# Patient Record
Sex: FEMALE | Race: WHITE
[De-identification: names, ages, dates, MRNs, and addresses within clinical notes are randomized per-mention and may not be internally consistent; named-entity substitution may affect disease eponyms.]

---

## 2017-09-07 ENCOUNTER — HOSPITAL ENCOUNTER (EMERGENCY)
Dept: HOSPITAL 46 - ED | Age: 32
Discharge: HOME | End: 2017-09-07
Payer: COMMERCIAL

## 2017-09-07 VITALS — WEIGHT: 201 LBS | HEIGHT: 65 IN | BODY MASS INDEX: 33.49 KG/M2

## 2017-09-07 DIAGNOSIS — Z91.030: ICD-10-CM

## 2017-09-07 DIAGNOSIS — E03.9: ICD-10-CM

## 2017-09-07 DIAGNOSIS — K52.9: Primary | ICD-10-CM

## 2017-09-07 DIAGNOSIS — Z79.899: ICD-10-CM

## 2017-09-07 DIAGNOSIS — Z87.442: ICD-10-CM

## 2017-09-07 DIAGNOSIS — F17.200: ICD-10-CM

## 2017-09-07 DIAGNOSIS — Z88.0: ICD-10-CM

## 2017-09-07 DIAGNOSIS — Z88.1: ICD-10-CM

## 2018-03-31 ENCOUNTER — HOSPITAL ENCOUNTER (EMERGENCY)
Dept: HOSPITAL 46 - ED | Age: 33
Discharge: HOME | End: 2018-03-31
Payer: COMMERCIAL

## 2018-03-31 VITALS — HEIGHT: 65 IN | WEIGHT: 201 LBS | BODY MASS INDEX: 33.49 KG/M2

## 2018-03-31 DIAGNOSIS — S04.62XA: Primary | ICD-10-CM

## 2018-03-31 DIAGNOSIS — Z87.442: ICD-10-CM

## 2018-03-31 DIAGNOSIS — Z79.899: ICD-10-CM

## 2018-03-31 DIAGNOSIS — Z88.0: ICD-10-CM
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
## 2018-03-31 DIAGNOSIS — F17.200: ICD-10-CM

## 2018-03-31 DIAGNOSIS — Z88.1: ICD-10-CM

## 2018-03-31 DIAGNOSIS — E03.9: ICD-10-CM

## 2018-03-31 DIAGNOSIS — Z91.030: ICD-10-CM

## 2018-03-31 NOTE — XMS
Clinical Summary
  Created on: 2018
 
 Zoila Gamboa
 External Reference #: [id number]
 : 87
 Sex: Female
 
 Demographics
 
 
+-----------------------+------------------------+
| Address               | [address]       |
|                       | [place]  [address]   |
+-----------------------+------------------------+
| Home Phone            | [phone]        |
+-----------------------+------------------------+
| Preferred Language    | Unknown                |
+-----------------------+------------------------+
| Marital Status        | Single                 |
+-----------------------+------------------------+
| Christian Affiliation | Unknown                |
+-----------------------+------------------------+
| Race                  | White                  |
+-----------------------+------------------------+
| Ethnic Group          | Not  or  |
+-----------------------+------------------------+
 
 
 Author
 
 
+--------------+---------------------+
| Author       | [facility] |
+--------------+---------------------+
| Organization | [facility] |
+--------------+---------------------+
| Address      | Unknown             |
+--------------+---------------------+
| Phone        | Unavailable         |
+--------------+---------------------+
 
 
 
 Support
 
 
+-------------+--------------+---------+-----------------+
| Name        | Relationship | Address | Phone           |
+-------------+--------------+---------+-----------------+
| Niraj Gamboa | ECON         | Unknown | [phone] |
+-------------+--------------+---------+-----------------+
 
 
 
 Care Team Providers
 
 
 
+-----------------------+------+-------------+
| Care Team Member Name | Role | Phone       |
+-----------------------+------+-------------+
| Sherrie Byrne NP | PP   | Unavailable |
+-----------------------+------+-------------+
 
 
 
 Source Comments
 [facility] is [facility] and [facility]
 
 Allergies
 
 
+-----------------+---------------------+----------+----------+------------------+
| Active Allergy  | Reactions           | Severity | Noted    | Comments         |
|                 |                     |          | Date     |                  |
+-----------------+---------------------+----------+----------+------------------+
| Acetaminophen   | Nausea and Vomiting |          | 10/27/20 |                  |
|                 |                     |          | 16       |                  |
+-----------------+---------------------+----------+----------+------------------+
| Amoxicillin     | Stomach Upset       |          | 10/27/20 |   Stomach issues |
|                 |                     |          | 16       |                  |
+-----------------+---------------------+----------+----------+------------------+
| Azithromycin    |                     |          | 20 |                  |
|                 |                     |          | 09       |                  |
+-----------------+---------------------+----------+----------+------------------+
| Ibuprofen       |                     |          | 20 |                  |
|                 |                     |          | 09       |                  |
+-----------------+---------------------+----------+----------+------------------+
| Morphine        | Nausea and Vomiting |          | 20 |                  |
|                 |                     |          | 17       |                  |
+-----------------+---------------------+----------+----------+------------------+
| Penicillins     | Stomach Upset       |          | 10/27/20 |   Stomach issues |
|                 |                     |          | 16       |                  |
+-----------------+---------------------+----------+----------+------------------+
| Venom-Honey Bee | Anaphylaxis         | High     | 10/27/20 |                  |
|                 |                     |          | 16       |                  |
+-----------------+---------------------+----------+----------+------------------+
 
 
 
 Current Medications
 
 
+----------------------+----------------------+--------+---------+------+------+-------+
| Prescription         | Sig.                 | Disp.  | Refills | Star | End  | Statu |
|                      |                      |        |         | t    | Date | s     |
|                      |                      |        |         | Date |      |       |
+----------------------+----------------------+--------+---------+------+------+-------+
|   levothyroxine 100  | Take 100 mcg by      |        | 0       | 09/2 |      | Activ |
| mcg oral tablet      | mouth once daily.    |        |         | 2/20 |      | e     |
|                      |                      |        |         | 16   |      |       |
+----------------------+----------------------+--------+---------+------+------+-------+
|   potassium chloride |                      |        | 1       | 08/1 |      | Activ |
|  SR 20 mEq oral      |                      |        |         | 1/20 |      | e     |
| tablet,ER            |                      |        |         | 16   |      |       |
| particles/crystals   |                      |        |         |      |      |       |
+----------------------+----------------------+--------+---------+------+------+-------+
 
|   meloxicam 15 mg    |                      |        | 1       | 09/1 |      | Activ |
| oral tablet          |                      |        |         | 1/20 |      | e     |
|                      |                      |        |         | 16   |      |       |
+----------------------+----------------------+--------+---------+------+------+-------+
|   nadolol 80 mg oral |                      |        | 0       | 09/2 |      | Activ |
|  tablet              |                      |        |         | 20 |      | e     |
|                      |                      |        |         | 16   |      |       |
+----------------------+----------------------+--------+---------+------+------+-------+
|   gabapentin 300 mg  |                      |        | 0       | 09/2 |      | Activ |
| oral capsule         |                      |        |         | /20 |      | e     |
|                      |                      |        |         | 16   |      |       |
+----------------------+----------------------+--------+---------+------+------+-------+
|   DULoxetine 30 mg   |                      |        | 0       | 09/2 |      | Activ |
| oral capsule,delayed |                      |        |         | /20 |      | e     |
|  release(DR/EC)      |                      |        |         | 16   |      |       |
+----------------------+----------------------+--------+---------+------+------+-------+
|   JULEBER 0.15-0.03  | Take 1 tablet by     |        | 0       | 09/2 |      | Activ |
| mg oral tablet       | mouth once daily.    |        |         |  |      | e     |
|                      |                      |        |         | 16   |      |       |
+----------------------+----------------------+--------+---------+------+------+-------+
|   omeprazole 40 mg   |                      |        |         |      |      | Activ |
| oral capsule,delayed |                      |        |         |      |      | e     |
|  release(DR/EC)      |                      |        |         |      |      |       |
+----------------------+----------------------+--------+---------+------+------+-------+
|   GLUCOSAMINE        | Take  by mouth.      |        |         |      |      | Activ |
| SULFATE (GLUCOSAMINE |                      |        |         |      |      | e     |
|  ORAL)               |                      |        |         |      |      |       |
+----------------------+----------------------+--------+---------+------+------+-------+
|   nitrofurantoin     | Take 100 mg by mouth |        | 0       | 02/2 |      | Activ |
| monohydrate/macrocry |  two times daily.    |        |         | /20 |      | e     |
| stal 100 mg oral     |                      |        |         | 17   |      |       |
| capsule              |                      |        |         |      |      |       |
+----------------------+----------------------+--------+---------+------+------+-------+
|   phenazopyridine    | Take 1 tablet by     |   30   | 1       | 02/2 |      | Activ |
| 100 mg oral tablet   | mouth three times    | tablet |         | 4/20 |      | e     |
|                      | daily as needed.     |        |         | 17   |      |       |
|                      | Administer after     |        |         |      |      |       |
|                      | meals for 2 days.    |        |         |      |      |       |
+----------------------+----------------------+--------+---------+------+------+-------+
|   promethazine 12.5  | Take 1 tablet by     |   30   | 1       | 02/2 |      | Activ |
| mg oral tablet       | mouth four times     | tablet |         | 4/20 |      | e     |
|                      | daily as needed for  |        |         | 17   |      |       |
|                      | nausea/vomiting.     |        |         |      |      |       |
+----------------------+----------------------+--------+---------+------+------+-------+
|                      | Take 1 tablet by     |   10   | 0       | 02/2 |      | Activ |
| HYDROcodone-acetamin | mouth every four     | tablet |         | 4/20 |      | e     |
| ophen 5-325 mg oral  | hours as needed.     |        |         | 17   |      |       |
| tablet               |                      |        |         |      |      |       |
+----------------------+----------------------+--------+---------+------+------+-------+
 
 
 
 Active Problems
 
 
+------------------------+------------+
| Problem                | Noted Date |
+------------------------+------------+
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
+------------------------+------------+
 
| Night vision loss      | 2009 |
+------------------------+------------+
| Vision loss            | 2009 |
+------------------------+------------+
 
 
 
 Social History
 
 
+--------------------+-------+-----------+--------+------+
| Tobacco Use        | Types | Packs/Day | Years  | Date |
|                    |       |           | Used   |      |
+--------------------+-------+-----------+--------+------+
| Current Every Day  |       |           |        |      |
| Smoker             |       |           |        |      |
+--------------------+-------+-----------+--------+------+
 
 
 
+--------------------------+
| Comments: trying to quit |
+--------------------------+
 
 
 
+------------------+---------------+
| Sex Assigned at  | Date Recorded |
| Birth            |               |
+------------------+---------------+
| Not on file      |               |
+------------------+---------------+
 
 
 
 Last Filed Vital Signs
 
 
+-------------------+----------------------+-------------------------+
| Vital Sign        | Reading              | Time Taken              |
+-------------------+----------------------+-------------------------+
| Blood Pressure    | 100/80               | 2017  2:04 PM PST |
+-------------------+----------------------+-------------------------+
| Pulse             | 79                   | 2017  2:04 PM PST |
+-------------------+----------------------+-------------------------+
| Temperature       | 37.2   C (99   F)    | 2017  2:04 PM PST |
+-------------------+----------------------+-------------------------+
| Respiratory Rate  | -                    | -                       |
+-------------------+----------------------+-------------------------+
| Oxygen Saturation | 96%                  | 2017  2:04 PM PST |
+-------------------+----------------------+-------------------------+
| Inhaled Oxygen    | -                    | -                       |
| Concentration     |                      |                         |
+-------------------+----------------------+-------------------------+
| Weight            | 100.3 kg (221 lb 1.6 | 2017  2:04 PM PST |
|                   |  oz)                 |                         |
+-------------------+----------------------+-------------------------+
| Height            | 165.1 cm (5' 5")     | 2017  2:04 PM PST |
+-------------------+----------------------+-------------------------+
| Body Mass Index   | 36.79                | 2017  2:04 PM PST |
 
+-------------------+----------------------+-------------------------+
 
 
 
 Plan of Treatment
 
 
+----------------------+-----------+------------+----------+
| Health Maintenance   | Due Date  | Last Done  | Comments |
+----------------------+-----------+------------+----------+
| PRECONCEPTION/CONTRA |  |            |          |
| CEPTION COUNSELING   | 6         |            |          |
+----------------------+-----------+------------+----------+
| INFLUENZA VACCINE    |  | 2016 |          |
| (FLU SHOT)           | 7         |            |          |
+----------------------+-----------+------------+----------+
 
 
 
 Results
 Not on filefrom Last 3 Months

## 2018-03-31 NOTE — XMS
Clinical Summary
  Created on: 2018
 
 Zoila Gamboa
 External Reference #: [id number]
 : 87
 Sex: Female
 
 Demographics
 
 
+-----------------------+------------------------+
| Address               | [address]       |
|                       | [place]  [address]   |
+-----------------------+------------------------+
| Home Phone            | [phone]        |
+-----------------------+------------------------+
| Preferred Language    | Unknown                |
+-----------------------+------------------------+
| Marital Status        | Single                 |
+-----------------------+------------------------+
| Shinto Affiliation | Unknown                |
+-----------------------+------------------------+
| Race                  | White                  |
+-----------------------+------------------------+
| Ethnic Group          | Not  or  |
+-----------------------+------------------------+
 
 
 Author
 
 
+--------------+---------------------+
| Author       | [facility] |
+--------------+---------------------+
| Organization | [facility] |
+--------------+---------------------+
| Address      | Unknown             |
+--------------+---------------------+
| Phone        | Unavailable         |
+--------------+---------------------+
 
 
 
 Support
 
 
+-------------+--------------+---------+-----------------+
| Name        | Relationship | Address | Phone           |
+-------------+--------------+---------+-----------------+
| Niraj Gamboa | ECON         | Unknown | [phone] |
+-------------+--------------+---------+-----------------+
 
 
 
 Care Team Providers
 
 
 
+-----------------------+------+-------------+
| Care Team Member Name | Role | Phone       |
+-----------------------+------+-------------+
| Sherrie Byrne NP | PP   | Unavailable |
+-----------------------+------+-------------+
 
 
 
 Source Comments
 [facility]CHU[facility] is fully live on both [facility]
 
 Allergies
 
 
+-----------------+---------------------+----------+----------+------------------+
| Active Allergy  | Reactions           | Severity | Noted    | Comments         |
|                 |                     |          | Date     |                  |
+-----------------+---------------------+----------+----------+------------------+
| Acetaminophen   | Nausea and Vomiting |          | 10/27/20 |                  |
|                 |                     |          | 16       |                  |
+-----------------+---------------------+----------+----------+------------------+
| Amoxicillin     | Stomach Upset       |          | 10/27/20 |   Stomach issues |
|                 |                     |          | 16       |                  |
+-----------------+---------------------+----------+----------+------------------+
| Azithromycin    |                     |          | 20 |                  |
|                 |                     |          | 09       |                  |
+-----------------+---------------------+----------+----------+------------------+
| Ibuprofen       |                     |          | 20 |                  |
|                 |                     |          | 09       |                  |
+-----------------+---------------------+----------+----------+------------------+
| Morphine        | Nausea and Vomiting |          | 20 |                  |
|                 |                     |          | 17       |                  |
+-----------------+---------------------+----------+----------+------------------+
| Penicillins     | Stomach Upset       |          | 10/27/20 |   Stomach issues |
|                 |                     |          | 16       |                  |
+-----------------+---------------------+----------+----------+------------------+
| Venom-Honey Bee | Anaphylaxis         | High     | 10/27/20 |                  |
|                 |                     |          | 16       |                  |
+-----------------+---------------------+----------+----------+------------------+
 
 
 
 Current Medications
 
 
+----------------------+----------------------+--------+---------+------+------+-------+
| Prescription         | Sig.                 | Disp.  | Refills | Star | End  | Statu |
|                      |                      |        |         | t    | Date | s     |
|                      |                      |        |         | Date |      |       |
+----------------------+----------------------+--------+---------+------+------+-------+
|   levothyroxine 100  | Take 100 mcg by      |        | 0       | 09/2 |      | Activ |
| mcg oral tablet      | mouth once daily.    |        |         | 2/20 |      | e     |
|                      |                      |        |         | 16   |      |       |
+----------------------+----------------------+--------+---------+------+------+-------+
|   potassium chloride |                      |        | 1       | 08/1 |      | Activ |
|  SR 20 mEq oral      |                      |        |         | 1/20 |      | e     |
| tablet,ER            |                      |        |         | 16   |      |       |
| particles/crystals   |                      |        |         |      |      |       |
+----------------------+----------------------+--------+---------+------+------+-------+
 
|   meloxicam 15 mg    |                      |        | 1       | 09/1 |      | Activ |
| oral tablet          |                      |        |         | 1/20 |      | e     |
|                      |                      |        |         | 16   |      |       |
+----------------------+----------------------+--------+---------+------+------+-------+
|   nadolol 80 mg oral |                      |        | 0       | 09/2 |      | Activ |
|  tablet              |                      |        |         | 20 |      | e     |
|                      |                      |        |         | 16   |      |       |
+----------------------+----------------------+--------+---------+------+------+-------+
|   gabapentin 300 mg  |                      |        | 0       | 09/2 |      | Activ |
| oral capsule         |                      |        |         | /20 |      | e     |
|                      |                      |        |         | 16   |      |       |
+----------------------+----------------------+--------+---------+------+------+-------+
|   DULoxetine 30 mg   |                      |        | 0       | 09/2 |      | Activ |
| oral capsule,delayed |                      |        |         | /20 |      | e     |
|  release(DR/EC)      |                      |        |         | 16   |      |       |
+----------------------+----------------------+--------+---------+------+------+-------+
|   JULEBER 0.15-0.03  | Take 1 tablet by     |        | 0       | 09/2 |      | Activ |
| mg oral tablet       | mouth once daily.    |        |         |  |      | e     |
|                      |                      |        |         | 16   |      |       |
+----------------------+----------------------+--------+---------+------+------+-------+
|   omeprazole 40 mg   |                      |        |         |      |      | Activ |
| oral capsule,delayed |                      |        |         |      |      | e     |
|  release(DR/EC)      |                      |        |         |      |      |       |
+----------------------+----------------------+--------+---------+------+------+-------+
|   GLUCOSAMINE        | Take  by mouth.      |        |         |      |      | Activ |
| SULFATE (GLUCOSAMINE |                      |        |         |      |      | e     |
|  ORAL)               |                      |        |         |      |      |       |
+----------------------+----------------------+--------+---------+------+------+-------+
|   nitrofurantoin     | Take 100 mg by mouth |        | 0       | 02/2 |      | Activ |
| monohydrate/macrocry |  two times daily.    |        |         | /20 |      | e     |
| stal 100 mg oral     |                      |        |         | 17   |      |       |
| capsule              |                      |        |         |      |      |       |
+----------------------+----------------------+--------+---------+------+------+-------+
|   phenazopyridine    | Take 1 tablet by     |   30   | 1       | 02/2 |      | Activ |
| 100 mg oral tablet   | mouth three times    | tablet |         | 4/20 |      | e     |
|                      | daily as needed.     |        |         | 17   |      |       |
|                      | Administer after     |        |         |      |      |       |
|                      | meals for 2 days.    |        |         |      |      |       |
+----------------------+----------------------+--------+---------+------+------+-------+
|   promethazine 12.5  | Take 1 tablet by     |   30   | 1       | 02/2 |      | Activ |
| mg oral tablet       | mouth four times     | tablet |         | 4/20 |      | e     |
|                      | daily as needed for  |        |         | 17   |      |       |
|                      | nausea/vomiting.     |        |         |      |      |       |
+----------------------+----------------------+--------+---------+------+------+-------+
|                      | Take 1 tablet by     |   10   | 0       | 02/2 |      | Activ |
| HYDROcodone-acetamin | mouth every four     | tablet |         | 4/20 |      | e     |
| ophen 5-325 mg oral  | hours as needed.     |        |         | 17   |      |       |
| tablet               |                      |        |         |      |      |       |
+----------------------+----------------------+--------+---------+------+------+-------+
 
 
 
 Active Problems
 
 
+------------------------+------------+
| Problem                | Noted Date |
+------------------------+------------+
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
+------------------------+------------+
 
| Night vision loss      | 2009 |
+------------------------+------------+
| Vision loss            | 2009 |
+------------------------+------------+
 
 
 
 Social History
 
 
+--------------------+-------+-----------+--------+------+
| Tobacco Use        | Types | Packs/Day | Years  | Date |
|                    |       |           | Used   |      |
+--------------------+-------+-----------+--------+------+
| Current Every Day  |       |           |        |      |
| Smoker             |       |           |        |      |
+--------------------+-------+-----------+--------+------+
 
 
 
+--------------------------+
| Comments: trying to quit |
+--------------------------+
 
 
 
+------------------+---------------+
| Sex Assigned at  | Date Recorded |
| Birth            |               |
+------------------+---------------+
| Not on file      |               |
+------------------+---------------+
 
 
 
 Last Filed Vital Signs
 
 
+-------------------+----------------------+-------------------------+
| Vital Sign        | Reading              | Time Taken              |
+-------------------+----------------------+-------------------------+
| Blood Pressure    | 100/80               | 2017  2:04 PM PST |
+-------------------+----------------------+-------------------------+
| Pulse             | 79                   | 2017  2:04 PM PST |
+-------------------+----------------------+-------------------------+
| Temperature       | 37.2   C (99   F)    | 2017  2:04 PM PST |
+-------------------+----------------------+-------------------------+
| Respiratory Rate  | -                    | -                       |
+-------------------+----------------------+-------------------------+
| Oxygen Saturation | 96%                  | 2017  2:04 PM PST |
+-------------------+----------------------+-------------------------+
| Inhaled Oxygen    | -                    | -                       |
| Concentration     |                      |                         |
+-------------------+----------------------+-------------------------+
| Weight            | 100.3 kg (221 lb 1.6 | 2017  2:04 PM PST |
|                   |  oz)                 |                         |
+-------------------+----------------------+-------------------------+
| Height            | 165.1 cm (5' 5")     | 2017  2:04 PM PST |
+-------------------+----------------------+-------------------------+
| Body Mass Index   | 36.79                | 2017  2:04 PM PST |
 
+-------------------+----------------------+-------------------------+
 
 
 
 Plan of Treatment
 
 
+----------------------+-----------+------------+----------+
| Health Maintenance   | Due Date  | Last Done  | Comments |
+----------------------+-----------+------------+----------+
| PRECONCEPTION/CONTRA |  |            |          |
| CEPTION COUNSELING   | 6         |            |          |
+----------------------+-----------+------------+----------+
| INFLUENZA VACCINE    |  | 2016 |          |
| (FLU SHOT)           | 7         |            |          |
+----------------------+-----------+------------+----------+
 
 
 
 Results
 Not on filefrom Last 3 Months

## 2018-04-04 ENCOUNTER — HOSPITAL ENCOUNTER (EMERGENCY)
Dept: HOSPITAL 46 - ED | Age: 33
Discharge: HOME | End: 2018-04-04
Payer: COMMERCIAL

## 2018-04-04 VITALS — HEIGHT: 65 IN | BODY MASS INDEX: 35.65 KG/M2 | WEIGHT: 214 LBS

## 2018-04-04 DIAGNOSIS — Z91.030: ICD-10-CM

## 2018-04-04 DIAGNOSIS — E03.9: ICD-10-CM

## 2018-04-04 DIAGNOSIS — Z88.5: ICD-10-CM
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
## 2018-04-04 DIAGNOSIS — Z79.899: ICD-10-CM

## 2018-04-04 DIAGNOSIS — F17.200: ICD-10-CM

## 2018-04-04 DIAGNOSIS — S82.434A: Primary | ICD-10-CM

## 2018-04-04 DIAGNOSIS — Z88.1: ICD-10-CM

## 2018-04-04 DIAGNOSIS — Z88.0: ICD-10-CM

## 2018-04-04 PROCEDURE — 2W3LX1Z IMMOBILIZATION OF RIGHT LOWER EXTREMITY USING SPLINT: ICD-10-PCS

## 2018-04-04 NOTE — XMS
Clinical Summary
  Created on: 2018
 
 Esteban Gamboane Thong
 External Reference #: [id number]
 : 87
 Sex: Female
 
 Demographics
 
 
+-----------------------+------------------------+
| Address               | [address]       |
|                       | [place]  [address]   |
+-----------------------+------------------------+
| Home Phone            | [phone]        |
+-----------------------+------------------------+
| Preferred Language    | Unknown                |
+-----------------------+------------------------+
| Marital Status        | Single                 |
+-----------------------+------------------------+
| Sabianist Affiliation | Unknown                |
+-----------------------+------------------------+
| Race                  | White                  |
+-----------------------+------------------------+
| Ethnic Group          | Not  or  |
+-----------------------+------------------------+
 
 
 Author
 
 
+--------------+---------------------+
| Author       | [facility] |
+--------------+---------------------+
| Organization | [facility] |
+--------------+---------------------+
| Address      | Unknown             |
+--------------+---------------------+
| Phone        | Unavailable         |
+--------------+---------------------+
 
 
 
 Support
 
 
+-------------+--------------+---------+-----------------+
| Name        | Relationship | Address | Phone           |
+-------------+--------------+---------+-----------------+
| Niraj Gamboa | ECON         | Unknown | [phone] |
+-------------+--------------+---------+-----------------+
 
 
 
 Care Team Providers
 
 
 
+-----------------------+------+-------------+
| Care Team Member Name | Role | Phone       |
+-----------------------+------+-------------+
| Sherrie Byrne NP | PP   | Unavailable |
+-----------------------+------+-------------+
 
 
 
 Source Comments
 [facility] is [facility] & [facility]
 
 Allergies
 
 
+-----------------+---------------------+----------+----------+------------------+
| Active Allergy  | Reactions           | Severity | Noted    | Comments         |
|                 |                     |          | Date     |                  |
+-----------------+---------------------+----------+----------+------------------+
| Acetaminophen   | Nausea and Vomiting |          | 10/27/20 |                  |
|                 |                     |          | 16       |                  |
+-----------------+---------------------+----------+----------+------------------+
| Amoxicillin     | Stomach Upset       |          | 10/27/20 |   Stomach issues |
|                 |                     |          | 16       |                  |
+-----------------+---------------------+----------+----------+------------------+
| Azithromycin    |                     |          | 20 |                  |
|                 |                     |          | 09       |                  |
+-----------------+---------------------+----------+----------+------------------+
| Ibuprofen       |                     |          | 20 |                  |
|                 |                     |          | 09       |                  |
+-----------------+---------------------+----------+----------+------------------+
| Morphine        | Nausea and Vomiting |          | 20 |                  |
|                 |                     |          | 17       |                  |
+-----------------+---------------------+----------+----------+------------------+
| Penicillins     | Stomach Upset       |          | 10/27/20 |   Stomach issues |
|                 |                     |          | 16       |                  |
+-----------------+---------------------+----------+----------+------------------+
| Venom-Honey Bee | Anaphylaxis         | High     | 10/27/20 |                  |
|                 |                     |          | 16       |                  |
+-----------------+---------------------+----------+----------+------------------+
 
 
 
 Current Medications
 
 
+----------------------+----------------------+--------+---------+------+------+-------+
| Prescription         | Sig.                 | Disp.  | Refills | Star | End  | Statu |
|                      |                      |        |         | t    | Date | s     |
|                      |                      |        |         | Date |      |       |
+----------------------+----------------------+--------+---------+------+------+-------+
|   levothyroxine 100  | Take 100 mcg by      |        | 0       | 09/2 |      | Activ |
| mcg oral tablet      | mouth once daily.    |        |         | 2/20 |      | e     |
|                      |                      |        |         | 16   |      |       |
+----------------------+----------------------+--------+---------+------+------+-------+
|   potassium chloride |                      |        | 1       | 08/1 |      | Activ |
|  SR 20 mEq oral      |                      |        |         | 1/20 |      | e     |
| tablet,ER            |                      |        |         | 16   |      |       |
| particles/crystals   |                      |        |         |      |      |       |
+----------------------+----------------------+--------+---------+------+------+-------+
 
|   meloxicam 15 mg    |                      |        | 1       | 09/1 |      | Activ |
| oral tablet          |                      |        |         | 1/20 |      | e     |
|                      |                      |        |         | 16   |      |       |
+----------------------+----------------------+--------+---------+------+------+-------+
|   nadolol 80 mg oral |                      |        | 0       | 09/2 |      | Activ |
|  tablet              |                      |        |         | 20 |      | e     |
|                      |                      |        |         | 16   |      |       |
+----------------------+----------------------+--------+---------+------+------+-------+
|   gabapentin 300 mg  |                      |        | 0       | 09/2 |      | Activ |
| oral capsule         |                      |        |         | /20 |      | e     |
|                      |                      |        |         | 16   |      |       |
+----------------------+----------------------+--------+---------+------+------+-------+
|   DULoxetine 30 mg   |                      |        | 0       | 09/2 |      | Activ |
| oral capsule,delayed |                      |        |         | /20 |      | e     |
|  release(DR/EC)      |                      |        |         | 16   |      |       |
+----------------------+----------------------+--------+---------+------+------+-------+
|   JULEBER 0.15-0.03  | Take 1 tablet by     |        | 0       | 09/2 |      | Activ |
| mg oral tablet       | mouth once daily.    |        |         |  |      | e     |
|                      |                      |        |         | 16   |      |       |
+----------------------+----------------------+--------+---------+------+------+-------+
|   omeprazole 40 mg   |                      |        |         |      |      | Activ |
| oral capsule,delayed |                      |        |         |      |      | e     |
|  release(DR/EC)      |                      |        |         |      |      |       |
+----------------------+----------------------+--------+---------+------+------+-------+
|   GLUCOSAMINE        | Take  by mouth.      |        |         |      |      | Activ |
| SULFATE (GLUCOSAMINE |                      |        |         |      |      | e     |
|  ORAL)               |                      |        |         |      |      |       |
+----------------------+----------------------+--------+---------+------+------+-------+
|   nitrofurantoin     | Take 100 mg by mouth |        | 0       | 02/2 |      | Activ |
| monohydrate/macrocry |  two times daily.    |        |         | /20 |      | e     |
| stal 100 mg oral     |                      |        |         | 17   |      |       |
| capsule              |                      |        |         |      |      |       |
+----------------------+----------------------+--------+---------+------+------+-------+
|   phenazopyridine    | Take 1 tablet by     |   30   | 1       | 02/2 |      | Activ |
| 100 mg oral tablet   | mouth three times    | tablet |         | 4/20 |      | e     |
|                      | daily as needed.     |        |         | 17   |      |       |
|                      | Administer after     |        |         |      |      |       |
|                      | meals for 2 days.    |        |         |      |      |       |
+----------------------+----------------------+--------+---------+------+------+-------+
|   promethazine 12.5  | Take 1 tablet by     |   30   | 1       | 02/2 |      | Activ |
| mg oral tablet       | mouth four times     | tablet |         | 4/20 |      | e     |
|                      | daily as needed for  |        |         | 17   |      |       |
|                      | nausea/vomiting.     |        |         |      |      |       |
+----------------------+----------------------+--------+---------+------+------+-------+
|                      | Take 1 tablet by     |   10   | 0       | 02/2 |      | Activ |
| HYDROcodone-acetamin | mouth every four     | tablet |         | 4/20 |      | e     |
| ophen 5-325 mg oral  | hours as needed.     |        |         | 17   |      |       |
| tablet               |                      |        |         |      |      |       |
+----------------------+----------------------+--------+---------+------+------+-------+
 
 
 
 Active Problems
 
 
+------------------------+------------+
| Problem                | Noted Date |
+------------------------+------------+
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
+------------------------+------------+
 
| Night vision loss      | 2009 |
+------------------------+------------+
| Vision loss            | 2009 |
+------------------------+------------+
 
 
 
 Social History
 
 
+--------------------+-------+-----------+--------+------+
| Tobacco Use        | Types | Packs/Day | Years  | Date |
|                    |       |           | Used   |      |
+--------------------+-------+-----------+--------+------+
| Current Every Day  |       |           |        |      |
| Smoker             |       |           |        |      |
+--------------------+-------+-----------+--------+------+
 
 
 
+--------------------------+
| Comments: trying to quit |
+--------------------------+
 
 
 
+------------------+---------------+
| Sex Assigned at  | Date Recorded |
| Birth            |               |
+------------------+---------------+
| Not on file      |               |
+------------------+---------------+
 
 
 
 Last Filed Vital Signs
 
 
+-------------------+----------------------+-------------------------+
| Vital Sign        | Reading              | Time Taken              |
+-------------------+----------------------+-------------------------+
| Blood Pressure    | 100/80               | 2017  2:04 PM PST |
+-------------------+----------------------+-------------------------+
| Pulse             | 79                   | 2017  2:04 PM PST |
+-------------------+----------------------+-------------------------+
| Temperature       | 37.2   C (99   F)    | 2017  2:04 PM PST |
+-------------------+----------------------+-------------------------+
| Respiratory Rate  | -                    | -                       |
+-------------------+----------------------+-------------------------+
| Oxygen Saturation | 96%                  | 2017  2:04 PM PST |
+-------------------+----------------------+-------------------------+
| Inhaled Oxygen    | -                    | -                       |
| Concentration     |                      |                         |
+-------------------+----------------------+-------------------------+
| Weight            | 100.3 kg (221 lb 1.6 | 2017  2:04 PM PST |
|                   |  oz)                 |                         |
+-------------------+----------------------+-------------------------+
| Height            | 165.1 cm (5' 5")     | 2017  2:04 PM PST |
+-------------------+----------------------+-------------------------+
| Body Mass Index   | 36.79                | 2017  2:04 PM PST |
 
+-------------------+----------------------+-------------------------+
 
 
 
 Plan of Treatment
 
 
+----------------------+-----------+------------+----------+
| Health Maintenance   | Due Date  | Last Done  | Comments |
+----------------------+-----------+------------+----------+
| PRECONCEPTION/CONTRA |  |            |          |
| CEPTION COUNSELING   | 6         |            |          |
+----------------------+-----------+------------+----------+
| INFLUENZA VACCINE    |  | 2016 |          |
| (FLU SHOT)           | 8         |            |          |
+----------------------+-----------+------------+----------+
 
 
 
 Results
 Not on filefrom Last 3 Months

## 2018-04-04 NOTE — XMS
Clinical Summary
  Created on: 2018
 
 Esteban Gamboane Thong
 External Reference #: [id number]
 : 87
 Sex: Female
 
 Demographics
 
 
+-----------------------+------------------------+
| Address               | [address]       |
|                       | [place]  [address]   |
+-----------------------+------------------------+
| Home Phone            | [phone]        |
+-----------------------+------------------------+
| Preferred Language    | Unknown                |
+-----------------------+------------------------+
| Marital Status        | Single                 |
+-----------------------+------------------------+
| [employer] Affiliation | Unknown                |
+-----------------------+------------------------+
| Race                  | White                  |
+-----------------------+------------------------+
| Ethnic Group          | Not  or  |
+-----------------------+------------------------+
 
 
 Author
 
 
+--------------+---------------------+
| Author       | [facility] |
+--------------+---------------------+
| Organization | [facility] |
+--------------+---------------------+
| Address      | Unknown             |
+--------------+---------------------+
| Phone        | Unavailable         |
+--------------+---------------------+
 
 
 
 Support
 
 
+-------------+--------------+---------+-----------------+
| Name        | Relationship | Address | Phone           |
+-------------+--------------+---------+-----------------+
| Niraj Gamboa | ECON         | Unknown | [phone] |
+-------------+--------------+---------+-----------------+
 
 
 
 Care Team Providers
 
 
 
+-----------------------+------+-------------+
| Care Team Member Name | Role | Phone       |
+-----------------------+------+-------------+
| Sherrie Byrne NP | PP   | Unavailable |
+-----------------------+------+-------------+
 
 
 
 Source Comments
 [facility] is fully live on both [facility] and [facility].Formerly [facility], [facility] & formerly [facility]
 
 Allergies
 
 
+-----------------+---------------------+----------+----------+------------------+
| Active Allergy  | Reactions           | Severity | Noted    | Comments         |
|                 |                     |          | Date     |                  |
+-----------------+---------------------+----------+----------+------------------+
| Acetaminophen   | Nausea and Vomiting |          | 10/27/20 |                  |
|                 |                     |          | 16       |                  |
+-----------------+---------------------+----------+----------+------------------+
| Amoxicillin     | Stomach Upset       |          | 10/27/20 |   Stomach issues |
|                 |                     |          | 16       |                  |
+-----------------+---------------------+----------+----------+------------------+
| Azithromycin    |                     |          | 20 |                  |
|                 |                     |          | 09       |                  |
+-----------------+---------------------+----------+----------+------------------+
| Ibuprofen       |                     |          | 20 |                  |
|                 |                     |          | 09       |                  |
+-----------------+---------------------+----------+----------+------------------+
| Morphine        | Nausea and Vomiting |          | 20 |                  |
|                 |                     |          | 17       |                  |
+-----------------+---------------------+----------+----------+------------------+
| Penicillins     | Stomach Upset       |          | 10/27/20 |   Stomach issues |
|                 |                     |          | 16       |                  |
+-----------------+---------------------+----------+----------+------------------+
| Venom-Honey Bee | Anaphylaxis         | High     | 10/27/20 |                  |
|                 |                     |          | 16       |                  |
+-----------------+---------------------+----------+----------+------------------+
 
 
 
 Current Medications
 
 
+----------------------+----------------------+--------+---------+------+------+-------+
| Prescription         | Sig.                 | Disp.  | Refills | Star | End  | Statu |
|                      |                      |        |         | t    | Date | s     |
|                      |                      |        |         | Date |      |       |
+----------------------+----------------------+--------+---------+------+------+-------+
|   levothyroxine 100  | Take 100 mcg by      |        | 0       | 09/2 |      | Activ |
| mcg oral tablet      | mouth once daily.    |        |         | 2/20 |      | e     |
|                      |                      |        |         | 16   |      |       |
+----------------------+----------------------+--------+---------+------+------+-------+
|   potassium chloride |                      |        | 1       | 08/1 |      | Activ |
|  SR 20 mEq oral      |                      |        |         | 1/20 |      | e     |
| tablet,ER            |                      |        |         | 16   |      |       |
| particles/crystals   |                      |        |         |      |      |       |
+----------------------+----------------------+--------+---------+------+------+-------+
 
|   meloxicam 15 mg    |                      |        | 1       | 09/1 |      | Activ |
| oral tablet          |                      |        |         | 1/20 |      | e     |
|                      |                      |        |         | 16   |      |       |
+----------------------+----------------------+--------+---------+------+------+-------+
|   nadolol 80 mg oral |                      |        | 0       | 09/2 |      | Activ |
|  tablet              |                      |        |         | 20 |      | e     |
|                      |                      |        |         | 16   |      |       |
+----------------------+----------------------+--------+---------+------+------+-------+
|   gabapentin 300 mg  |                      |        | 0       | 09/2 |      | Activ |
| oral capsule         |                      |        |         | /20 |      | e     |
|                      |                      |        |         | 16   |      |       |
+----------------------+----------------------+--------+---------+------+------+-------+
|   DULoxetine 30 mg   |                      |        | 0       | 09/2 |      | Activ |
| oral capsule,delayed |                      |        |         | /20 |      | e     |
|  release(DR/EC)      |                      |        |         | 16   |      |       |
+----------------------+----------------------+--------+---------+------+------+-------+
|   JULEBER 0.15-0.03  | Take 1 tablet by     |        | 0       | 09/2 |      | Activ |
| mg oral tablet       | mouth once daily.    |        |         |  |      | e     |
|                      |                      |        |         | 16   |      |       |
+----------------------+----------------------+--------+---------+------+------+-------+
|   omeprazole 40 mg   |                      |        |         |      |      | Activ |
| oral capsule,delayed |                      |        |         |      |      | e     |
|  release(DR/EC)      |                      |        |         |      |      |       |
+----------------------+----------------------+--------+---------+------+------+-------+
|   GLUCOSAMINE        | Take  by mouth.      |        |         |      |      | Activ |
| SULFATE (GLUCOSAMINE |                      |        |         |      |      | e     |
|  ORAL)               |                      |        |         |      |      |       |
+----------------------+----------------------+--------+---------+------+------+-------+
|   nitrofurantoin     | Take 100 mg by mouth |        | 0       | 02/2 |      | Activ |
| monohydrate/macrocry |  two times daily.    |        |         | /20 |      | e     |
| stal 100 mg oral     |                      |        |         | 17   |      |       |
| capsule              |                      |        |         |      |      |       |
+----------------------+----------------------+--------+---------+------+------+-------+
|   phenazopyridine    | Take 1 tablet by     |   30   | 1       | 02/2 |      | Activ |
| 100 mg oral tablet   | mouth three times    | tablet |         | 4/20 |      | e     |
|                      | daily as needed.     |        |         | 17   |      |       |
|                      | Administer after     |        |         |      |      |       |
|                      | meals for 2 days.    |        |         |      |      |       |
+----------------------+----------------------+--------+---------+------+------+-------+
|   promethazine 12.5  | Take 1 tablet by     |   30   | 1       | 02/2 |      | Activ |
| mg oral tablet       | mouth four times     | tablet |         | 4/20 |      | e     |
|                      | daily as needed for  |        |         | 17   |      |       |
|                      | nausea/vomiting.     |        |         |      |      |       |
+----------------------+----------------------+--------+---------+------+------+-------+
|                      | Take 1 tablet by     |   10   | 0       | 02/2 |      | Activ |
| HYDROcodone-acetamin | mouth every four     | tablet |         | 4/20 |      | e     |
| ophen 5-325 mg oral  | hours as needed.     |        |         | 17   |      |       |
| tablet               |                      |        |         |      |      |       |
+----------------------+----------------------+--------+---------+------+------+-------+
 
 
 
 Active Problems
 
 
+------------------------+------------+
| Problem                | Noted Date |
+------------------------+------------+
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
+------------------------+------------+
 
| Night vision loss      | 2009 |
+------------------------+------------+
| Vision loss            | 2009 |
+------------------------+------------+
 
 
 
 Social History
 
 
+--------------------+-------+-----------+--------+------+
| Tobacco Use        | Types | Packs/Day | Years  | Date |
|                    |       |           | Used   |      |
+--------------------+-------+-----------+--------+------+
| Current Every Day  |       |           |        |      |
| Smoker             |       |           |        |      |
+--------------------+-------+-----------+--------+------+
 
 
 
+--------------------------+
| Comments: trying to quit |
+--------------------------+
 
 
 
+------------------+---------------+
| Sex Assigned at  | Date Recorded |
| Birth            |               |
+------------------+---------------+
| Not on file      |               |
+------------------+---------------+
 
 
 
 Last Filed Vital Signs
 
 
+-------------------+----------------------+-------------------------+
| Vital Sign        | Reading              | Time Taken              |
+-------------------+----------------------+-------------------------+
| Blood Pressure    | 100/80               | 2017  2:04 PM PST |
+-------------------+----------------------+-------------------------+
| Pulse             | 79                   | 2017  2:04 PM PST |
+-------------------+----------------------+-------------------------+
| Temperature       | 37.2   C (99   F)    | 2017  2:04 PM PST |
+-------------------+----------------------+-------------------------+
| Respiratory Rate  | -                    | -                       |
+-------------------+----------------------+-------------------------+
| Oxygen Saturation | 96%                  | 2017  2:04 PM PST |
+-------------------+----------------------+-------------------------+
| Inhaled Oxygen    | -                    | -                       |
| Concentration     |                      |                         |
+-------------------+----------------------+-------------------------+
| Weight            | 100.3 kg (221 lb 1.6 | 2017  2:04 PM PST |
|                   |  oz)                 |                         |
+-------------------+----------------------+-------------------------+
| Height            | 165.1 cm (5' 5")     | 2017  2:04 PM PST |
+-------------------+----------------------+-------------------------+
| Body Mass Index   | 36.79                | 2017  2:04 PM PST |
 
+-------------------+----------------------+-------------------------+
 
 
 
 Plan of Treatment
 
 
+----------------------+-----------+------------+----------+
| Health Maintenance   | Due Date  | Last Done  | Comments |
+----------------------+-----------+------------+----------+
| PRECONCEPTION/CONTRA |  |            |          |
| CEPTION COUNSELING   | 6         |            |          |
+----------------------+-----------+------------+----------+
| INFLUENZA VACCINE    |  | 2016 |          |
| (FLU SHOT)           | 8         |            |          |
+----------------------+-----------+------------+----------+
 
 
 
 Results
 Not on filefrom Last 3 Months

## 2018-04-06 ENCOUNTER — HOSPITAL ENCOUNTER (EMERGENCY)
Dept: HOSPITAL 46 - ED | Age: 33
Discharge: HOME | End: 2018-04-06
Payer: COMMERCIAL

## 2018-04-06 VITALS — BODY MASS INDEX: 35.32 KG/M2 | HEIGHT: 65 IN | WEIGHT: 212 LBS

## 2018-04-06 DIAGNOSIS — S82.831D: ICD-10-CM

## 2018-04-06 DIAGNOSIS — S93.402A: Primary | ICD-10-CM

## 2018-04-06 DIAGNOSIS — Z87.442: ICD-10-CM

## 2018-04-06 DIAGNOSIS — F17.200: ICD-10-CM

## 2018-04-06 DIAGNOSIS — Z88.1: ICD-10-CM

## 2018-04-06 DIAGNOSIS — Z88.5: ICD-10-CM

## 2018-04-06 DIAGNOSIS — Z79.899: ICD-10-CM

## 2018-04-06 DIAGNOSIS — E03.9: ICD-10-CM

## 2018-04-06 DIAGNOSIS — Z91.030: ICD-10-CM
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
## 2018-04-06 DIAGNOSIS — Z88.0: ICD-10-CM
